# Patient Record
Sex: FEMALE | Employment: STUDENT | URBAN - METROPOLITAN AREA
[De-identification: names, ages, dates, MRNs, and addresses within clinical notes are randomized per-mention and may not be internally consistent; named-entity substitution may affect disease eponyms.]

---

## 2017-04-13 ENCOUNTER — OFFICE VISIT (OUTPATIENT)
Dept: FAMILY MEDICINE CLINIC | Age: 42
End: 2017-04-13

## 2017-04-13 VITALS
SYSTOLIC BLOOD PRESSURE: 122 MMHG | DIASTOLIC BLOOD PRESSURE: 63 MMHG | WEIGHT: 146.2 LBS | HEART RATE: 84 BPM | TEMPERATURE: 98.1 F | OXYGEN SATURATION: 100 % | HEIGHT: 66 IN | BODY MASS INDEX: 23.5 KG/M2 | RESPIRATION RATE: 18 BRPM

## 2017-04-13 DIAGNOSIS — S76.312A LEFT HAMSTRING MUSCLE STRAIN, INITIAL ENCOUNTER: Primary | ICD-10-CM

## 2017-04-13 RX ORDER — ETONOGESTREL AND ETHINYL ESTRADIOL 11.7; 2.7 MG/1; MG/1
INSERT, EXTENDED RELEASE VAGINAL
COMMUNITY

## 2017-04-13 RX ORDER — CETIRIZINE HCL 10 MG
10 TABLET ORAL DAILY
COMMUNITY

## 2017-04-13 RX ORDER — NAPROXEN SODIUM 220 MG
220 TABLET ORAL 2 TIMES DAILY WITH MEALS
COMMUNITY

## 2017-04-13 RX ORDER — BACLOFEN 20 MG/1
20 TABLET ORAL
COMMUNITY

## 2017-04-13 NOTE — PATIENT INSTRUCTIONS
To Do:  · \"use pain as your guide\". .. Meaning, only do pain-free activities. · Consider keeping a detailed activity journal, so you can determine what activities make your discomfort worse, and what you can actually tolerate. · No running until the physical therapists say you can. Hamstring Strain: Care Instructions  Your Care Instructions    A hamstring strain happens when you overstretch, or pull, the muscles that run down the back of your thigh. It can happen when you exercise or lift something or if you're injured in an accident. You may feel pain and tenderness that's worse when you move your injured leg. The back of your thigh may be swollen and bruised. If you have a bad strain, you may not be able to move your leg normally. While a minor strain often heals well with rest and other treatment, a severe strain may require medical treatment. If a severe strain isn't treated, you may have long-lasting problems. Follow-up care is a key part of your treatment and safety. Be sure to make and go to all appointments, and call your doctor if you are having problems. It's also a good idea to know your test results and keep a list of the medicines you take. How can you care for yourself at home? · Rest your injured leg. Don't put weight on it for a day or two. If your doctor advises you to, use crutches to rest the leg. · Put ice or a cold pack on the back of your thigh for 10 to 20 minutes at a time to stop swelling. Put a thin cloth between the ice and your skin. · Wrapping your thigh with an elastic bandage (such as an Ace wrap), will help decrease swelling. Don't wrap it too tightly, since this can cause more swelling below the affected area. · Elevate your thigh on pillows while applying ice and anytime you are sitting or lying down. · Ask your doctor if you can take an over-the-counter pain medicine, such as acetaminophen (Tylenol), ibuprofen (Advil, Motrin), or naproxen (Aleve).  Be safe with medicines. Read and follow all instructions on the label. · Don't do anything that makes the pain worse. Return to your usual level of activity slowly. When should you call for help? Call your doctor now or seek immediate medical care if:  · You have severe or increasing pain. · You have tingling, weakness, or numbness in your injured leg. · You cannot move your injured leg. Watch closely for changes in your health, and be sure to contact your doctor if:  · You do not get better as expected. Where can you learn more? Go to http://adriano-stevan.info/. Enter U475 in the search box to learn more about \"Hamstring Strain: Care Instructions. \"  Current as of: May 23, 2016  Content Version: 11.2  © 5352-6439 [employer]. Care instructions adapted under license by [employer] (which disclaims liability or warranty for this information). If you have questions about a medical condition or this instruction, always ask your healthcare professional. [address] any warranty or liability for your use of this information.

## 2017-04-13 NOTE — PROGRESS NOTES
[facility]  Sports Medicine Consultation Note    Caridad Arce is a 43 y.o. female (: 1975) presenting to obtain consultative services regarding:  Chief Complaint   Patient presents with    Leg Pain     HAMSTRING STRAIN STARTED IN [facility]        PCP: Maryellen Bates NP  Requesting provider: self-referral    Assessment/Plan:   Sherwin Borges was seen today for leg pain. Diagnoses and all orders for this visit:    Left hamstring muscle strain, initial encounter  Initial encounter. Symptomatic x 3mo, and is quite anxious to return to physical activity. Counseled at length re: the difficulty of recovering from hamstring injury without the assistance of formal rehabilitation. Caridad Arce amendable to formal phyiscal therapy, may benefit from dry needling if indicated. Likely would benefit from gait analysi for prevention of future visits. Follow-up in 6-8 weeks to ensure improvement in condition.  -     REFERRAL TO PHYSICAL THERAPY    Future Appointments  Date Time Provider Kavitha Hernandez   2017 10:40 AM Manedep Ochoa MD [facility]       Spent 45min face-to-face, >50% spent on counseling & patient education. Overdue  items:   Ms. Ady Thomas has a reminder for a \"due or due soon\" health maintenance. I have asked that she contact her primary care provider for follow-up on this health maintenance. Management plan & patient instructions discussed with Caridad Arce, who voiced understanding. Thank you for the opportunity to participate in the care of this patient. If any questions or concerns at all, please feel free to contact me. This document may have been created with the aid of dictation software. Text may contain errors, particularly phonetic errors. Leonel Chaney MD  Internal Medicine, Family Medicine & Sports Medicine  2017, 11:30 AM    Patient Instructions (provided in AVS):      To Do:  · \"use pain as your guide\". .. Meaning, only do pain-free activities. · Consider keeping a detailed activity journal, so you can determine what activities make your discomfort worse, and what you can actually tolerate. · No running until the physical therapists say you can. Hamstring Strain: Care Instructions    History:     I was asked to provide consultative services by Alexander Holloway NP for advice/opinion related to evaluating    Chief Complaint   Patient presents with    Leg Pain     HAMSTRING STRAIN STARTED [address]        Is a . Injury to left hamstring 3mo ago, when she switched routine from 2x/wk running on soft sand to 4-5x/wk on soft sand  Went to massage therapy in early Feb, who told her that she has \"Fluid on her knee\". Then went to see her PCP on 2/9/2017, who instructed her to apply ice and stop running. She did stop running as instructed (now 10wks ago), using ice, and alleve ~ 6 tabs daily, which does seem to help. The swelling did improve, but notes continued tingling to the posterior aspect of the left thigh. She is quite frustrated with this injury, and the fact that she hasn't recovered yet. She has been gaining weight, despite trying to stay as active as she can, including spinning. Past Medical History:   Diagnosis Date    ADD (attention deficit disorder)     Bulging of cervical intervertebral disc     Hypercholesterolemia      Past Surgical History:   Procedure Laterality Date    HX TONSILLECTOMY      HX WISDOM TEETH EXTRACTION        reports that she has never smoked. She has never used smokeless tobacco. She reports that she drinks alcohol. She reports that she does not use illicit drugs.   Family History   Problem Relation Age of Onset    Diabetes Mother     Heart Disease Father      Allergies   Allergen Reactions    Codeine Itching    Vicodin [Hydrocodone-Acetaminophen] Hives       Problem List:    There are no active problems to display for this patient. Medications:     No current outpatient prescriptions on file prior to visit. No current facility-administered medications on file prior to visit. Review of Systems:     (positives in bold)   Constitutional: fatigue, weight change, appetite change, fevers, chills   Eyes: itchy eyes, runny eyes, red eyes, eye discharge, vision changes, light sensitivity   Neuro: headaches, vision changes, dizziness, loss of consciousness, burning pain, tingling, numbness   ENT: ear pain, ear pressure, ear popping, ear discharge/drainage, hearing change,nosebleeds, sneezing, runny nose, nasal congestion,  change in sense of smell, sore throat, voice change or hoarse voice,   dry mouth, toothache, jaw popping, difficulty swallowing, painful swallowing,   oral ulcers or canker sores   Cardiovascular: chest pain, palpitations, orthopnea, PND   Respiratory: dyspnea, wheezing, cough, sputum production, hemoptysis   GI: nausea, vomiting, heartburn, abdominal pain, greasy stools,   blood in stool, diarrhea, constipation   : dysuria, hematuria, change in urine, urinary frequency, urinary urgency   MSK: muscle/joint pain, joint swelling   Derm: rashes, skin changes   Allergy/Imm: seasonal allergies, itchy eyes   Endocrine: Polyuria, polydipsia, polyphagia, heat intolerance, cold intolerance   Heme/lymph: easy bleeding/bruising   Psych: Suicidal ideation, homicidal ideation         Physical Assessment:   VS:    Vitals:    04/13/17 1055   BP: 122/63   Pulse: 84   Resp: 18   Temp: 98.1 °F (36.7 °C)   TempSrc: Oral   SpO2: 100%   Weight: 146 lb 3.2 oz (66.3 kg)   Height: 5' 6\" (1.676 m)   PainSc:   0 - No pain       General:     Well-developed, well-nourished female, pleasant, appropriate and conversant. Head:     No facial asymmetry noted. Extremities:     No edema, no TTP bilateral calves. No joint effusions. LEs warm and well-perfused.   Significantly weaker left knee flexion in comparison to right on MMT  TTP left distal 1/3 of hamstring, without palpable defect  nontender bilateral ASIS, greater troch, ITB, ischial tuberosities  5/5 hip abduction/adduction, hip flexion bilaterally without pain  Neuro:     Alert and oriented, CNs II-XII intact, no focal deficits. Skin:      No rashes noted.

## 2017-04-13 NOTE — MR AVS SNAPSHOT
Visit Information Date & Time Provider Department Dept. Phone Encounter #  
 4/13/2017 10:40 AM Paula Garcia MD [address] [place] [address] [phone] [id number] Follow-up Instructions Return in about 2 months (around 6/13/2017) for 30min sports med f/u. Upcoming Health Maintenance Date Due DTaP/Tdap/Td series (1 - Tdap) 1/1/1996 PAP AKA CERVICAL CYTOLOGY 1/1/1996 INFLUENZA AGE 9 TO ADULT 8/1/2016 Allergies as of 4/13/2017  Review Complete On: 4/13/2017 By: Hermilo Rios LPN Severity Noted Reaction Type Reactions Codeine  04/13/2017    Itching Vicodin [Hydrocodone-acetaminophen]  04/13/2017    Hives Current Immunizations  Never Reviewed No immunizations on file. Not reviewed this visit You Were Diagnosed With   
  
 Codes Comments Left hamstring muscle strain, initial encounter    -  Primary ICD-10-CM: C51.233Q ICD-9-CM: 843.8 Vitals BP Pulse Temp Resp Height(growth percentile) Weight(growth percentile) 122/63 (BP 1 Location: Right arm, BP Patient Position: Sitting) 84 98.1 °F (36.7 °C) (Oral) 18 5' 6\" (1.676 m) 146 lb 3.2 oz (66.3 kg) SpO2 BMI OB Status Smoking Status 100% 23.6 kg/m2 Medically Induced Never Smoker Vitals History BMI and BSA Data Body Mass Index Body Surface Area  
 23.6 kg/m 2 1.76 m 2 Preferred Pharmacy Pharmacy Name Phone [facility], [address] [phone] Your Updated Medication List  
  
   
This list is accurate as of: 4/13/17 12:09 PM.  Always use your most recent med list.  
  
  
  
  
 ALEVE 220 mg tablet Generic drug:  naproxen sodium Take 220 mg by mouth two (2) times daily (with meals). baclofen 20 mg tablet Commonly known as:  LIORESAL Take 20 mg by mouth three (3) times daily as needed. cetirizine 10 mg tablet Commonly known as:  ZYRTEC Take 10 mg by mouth daily. ethinyl estradiol-etonogestrel 0.12-0.015 mg/24 hr vaginal ring Commonly known as:  NUVARING  
every twenty-eight (28) days. VYVANSE 60 mg capsule Generic drug:  Lisdexamfetamine Take 60 mg by mouth daily. We Performed the Following REFERRAL TO PHYSICAL THERAPY [BXU19 Custom] Comments:  
 Please eval & treat. Dry needling as indicated. Female therapist requested. Follow-up Instructions Return in about 2 months (around 6/13/2017) for 30min sports med f/u. Referral Information Referral ID Referred By Referred To  
  
 [id number] Kerry Jones 10 [facility], [address] [place] [address] Phone: [phone] Fax: [phone] Visits Status Start Date End Date 1 New Request 4/13/17 4/13/18 If your referral has a status of pending review or denied, additional information will be sent to support the outcome of this decision. Patient Instructions To Do: 
· \"use pain as your guide\". .. Meaning, only do pain-free activities. · Consider keeping a detailed activity journal, so you can determine what activities make your discomfort worse, and what you can actually tolerate. · No running until the physical therapists say you can. Hamstring Strain: Care Instructions Your Care Instructions A hamstring strain happens when you overstretch, or pull, the muscles that run down the back of your thigh. It can happen when you exercise or lift something or if you're injured in an accident. You may feel pain and tenderness that's worse when you move your injured leg. The back of your thigh may be swollen and bruised. If you have a bad strain, you may not be able to move your leg normally.  
While a minor strain often heals well with rest and other treatment, a severe strain may require medical treatment. If a severe strain isn't treated, you may have long-lasting problems. Follow-up care is a key part of your treatment and safety. Be sure to make and go to all appointments, and call your doctor if you are having problems. It's also a good idea to know your test results and keep a list of the medicines you take. How can you care for yourself at home? · Rest your injured leg. Don't put weight on it for a day or two. If your doctor advises you to, use crutches to rest the leg. · Put ice or a cold pack on the back of your thigh for 10 to 20 minutes at a time to stop swelling. Put a thin cloth between the ice and your skin. · Wrapping your thigh with an elastic bandage (such as an Ace wrap), will help decrease swelling. Don't wrap it too tightly, since this can cause more swelling below the affected area. · Elevate your thigh on pillows while applying ice and anytime you are sitting or lying down. · Ask your doctor if you can take an over-the-counter pain medicine, such as acetaminophen (Tylenol), ibuprofen (Advil, Motrin), or naproxen (Aleve). Be safe with medicines. Read and follow all instructions on the label. · Don't do anything that makes the pain worse. Return to your usual level of activity slowly. When should you call for help? Call your doctor now or seek immediate medical care if: 
· You have severe or increasing pain. · You have tingling, weakness, or numbness in your injured leg. · You cannot move your injured leg. Watch closely for changes in your health, and be sure to contact your doctor if: 
· You do not get better as expected. Where can you learn more? Go to http://adriano-stevan.info/. Enter D744 in the search box to learn more about \"Hamstring Strain: Care Instructions. \" Current as of: May 23, 2016 Content Version: 11.2 © 3019-1243 [employer].  Care instructions adapted under license by [address] (which disclaims liability or warranty for this information). If you have questions about a medical condition or this instruction, always ask your healthcare professional. [address] any warranty or liability for your use of this information. Introducing [facility]! [facility] introduces [employer] patient portal. Now you can access parts of your medical record, email your doctor's office, and request medication refills online. 1. In your internet browser, go to [email or web address] [email or web address] 2. Click on the First Time User? Click Here link in the Sign In box. You will see the New Member Sign Up page. 3. Enter your [employer] Access Code exactly as it appears below. You will not need to use this code after youve completed the sign-up process. If you do not sign up before the expiration date, you must request a new code. · [employer] Access Code: [email or web address] Expires: 7/12/2017 12:09 PM 
 
4. Enter the last four digits of your Social Security Number (xxxx) and Date of Birth (mm/dd/yyyy) as indicated and click Submit. You will be taken to the next sign-up page. 5. Create a [employer] ID. This will be your [employer] login ID and cannot be changed, so think of one that is secure and easy to remember. 6. Create a [employer] password. You can change your password at any time. 7. Enter your Password Reset Question and Answer. This can be used at a later time if you forget your password. 8. Enter your e-mail address. You will receive e-mail notification when new information is available in [address] 9. Click Sign Up. You can now view and download portions of your medical record. 10. Click the Download Summary menu link to download a portable copy of your medical information. If you have questions, please visit the Frequently Asked Questions section of the [employer] website.  Remember, [employer] is NOT to be used for urgent needs. For medical emergencies, dial 911. Now available from your iPhone and Android! Please provide this summary of care documentation to your next provider. Your primary care clinician is listed as SILVIA LINARES. If you have any questions after today's visit, please call [phone].

## 2017-06-05 ENCOUNTER — TELEPHONE (OUTPATIENT)
Dept: FAMILY MEDICINE CLINIC | Age: 42
End: 2017-06-05

## 2021-06-15 ENCOUNTER — HOSPITAL ENCOUNTER (OUTPATIENT)
Dept: PHYSICAL THERAPY | Age: 46
Discharge: HOME | End: 2021-06-15
Payer: COMMERCIAL

## 2021-06-15 PROCEDURE — 97535 SELF CARE MNGMENT TRAINING: CPT

## 2021-06-15 PROCEDURE — 97162 PT EVAL MOD COMPLEX 30 MIN: CPT

## 2021-06-15 NOTE — PROGRESS NOTES
[facility] PHYSICAL THERAPY  [address] [place] [address] [address],[place], [address] [facility] - Phone: [phone]  Fax: [phone] / [facility]  Patient Name: Lakisha Saenz : 1975   Medical Diagnosis: Neck pain [M54.2] Treatment Diagnosis: Neck pain [M54.2]   Onset Date: 10 years ago     Referral Source: Neil Gotti MD Start of Care [facility] 6/15/2021   Prior Hospitalization: See medical history Provider #: [id number]   Prior Level of Function: Garden, working out regularly   Comorbidities: Aug 2019 L5-S1 fusion, chronicity of symptoms   Medications: Verified on Patient Summary List   The Plan of Care and following information is based on the information from the initial evaluation.   ===========================================================================================  Assessment / key information:  Patient is 55 y.o. female who presents to [facility], [facility]. with diagnosis of Neck pain [M54.2]. Pt reports insidious onset neck pain approx 10 years ago. Pt has pursued chiropractic and acupuncture services without lasting relief. Objective data detailed below. Patient scored 46 on FOTO indicating decreased function and quality of life. Pt would benefit from skilled PT services to address impairments, work towards goals, and return to OF. Per pt report has had several MRIs and a nerve conduction test, but says their findings are not consistent with her symptoms.     Pain: current 1/10, at worst 8/10, at best 1/10  Aggravating factors: Unable to recall what makes her neck worse, typically hurts more later in the day  Alleviating factors: rest, using a light weight, injections (C1-2, C6-7), TENS unit, massage, stretches, chiropractic (temporary), acupuncture (temporary)  Pain description: tightness, spasms  Pain location: R side of neck, R suboccipitals, bilat rhomboids and traps  Radiation? Numbness/tingling? Previously into thumb through C7 dermatome patterning (managed with Gabapentin)    Cervical AROM: flex WNL, ext WNL, SB R 30 L 30, rot R WNL L WNL  Soft touch: grossly intact  Palpation: TTP and inc tone bilat UT/mid trap, lev scap; R suboccipitals into cervical/thoracic paraspinals    Treatment performed: MH to c-spine and mid/upper back s/p eval in supine H/L x10 mins  Patient response to treatment: Pt reports inc in tenderness as site of palpation, but that [address] helped to decrease it somewhat  ===========================================================================================  Eval Complexity: History MEDIUM  Complexity : 1-2 comorbidities / personal factors will impact the outcome/ POC ;  Examination  MEDIUM Complexity : 3 Standardized tests and measures addressing body structure, function, activity limitation and / or participation in recreation ; Presentation MEDIUM Complexity : Evolving with changing characteristics ; Decision Making MEDIUM Complexity : FOTO score of 26-74; Overall Complexity MEDIUM  Problem List: pain affecting function, decrease ROM, decrease strength, decrease ADL/ functional abilitiies, decrease activity tolerance, decrease flexibility/ joint mobility and decrease transfer abilities   Treatment Plan may include any combination of the following: Therapeutic exercise, Therapeutic activities, Neuromuscular re-education, Physical agent/modality, Manual therapy, Patient education and Functional mobility training  Patient / Family readiness to learn indicated by: asking questions, trying to perform skills and interest  Persons(s) to be included in education: patient (P)  Barriers to Learning/Limitations: None  Measures taken, if barriers to learning:    Patient Goal (s): \"Reduce medication, improve flexibility, reduce spasms\"   Patient self reported health status: fair  Rehabilitation Potential: fair   Short Term Goals:  To be accomplished in 3 weeks:  1) Pt performing HEP as prescribed to facilitate appointment carry over. 2) Patient will report 25% improvement in symptoms during UE functional tasks. 3) Patient to demo cervical SB AROM >=40 in order to have improved functional mobility. 4) Increase FOTO score to 57 indicating improved function and QOL. 5) Pt to report >=+3 on GROC indicating clinically significant subjective functional improvement.  Long Term Goals: To be accomplished in 6 weeks:  1) Patient Independent with progressive HEP to facilitate symptom management and progression upon DC. 2) Increase FOTO score to 63 indicating improved function and QOL. 3) Patient to demo proper DNF activation and control in order to have improved cervical stabilization during functional tasks. 4) Pt to report >=+5 on GROC indicating clinically significant subjective functional improvement. 5) Pt to report subjective improvement in TTP during palpation in order to have improved cervical motility. Frequency / Duration: Patient to be seen  2  times per week for 6  weeks:  Patient / Caregiver education and instruction: other PT diagnosis, prognosis, POC  Therapist Signature: Richar Mathias PT Date: 9/29/2265   Certification Period: na Time: 8:39 AM   ===========================================================================================  I certify that the above Physical Therapy Services are being furnished while the patient is under my care. I agree with the treatment plan and certify that this therapy is necessary. Physician Signature:        Date:       Time:                                        Sp Duncan MD    Please sign and return to [facility], [facility]. or you may fax the signed copy to [phone]. Thank you.

## 2021-06-15 NOTE — PROGRESS NOTES
PHYSICAL THERAPY - DAILY TREATMENT NOTE    Patient Name: Lydia Arreola        Date: 6/15/2021  : 1975   yes Patient  Verified  Visit #:     Insurance: Payor: Belkys Oliva / Plan:  [address] [facility] / Product Type: Commerical /      In time: 845 am Out time: 942 am   Total Treatment Time: 57     Medicare/BCBS Time Tracking (below)   Total Timed Codes (min):  na 1:1 Treatment Time:  na     TREATMENT AREA =  Neck pain [M54.2]    SUBJECTIVE  Pain Level (on 0 to 10 scale):  1 / 10   Medication Changes/New allergies or changes in medical history, any new surgeries or procedures?    no  If yes, update Summary List   Subjective Functional Status/Changes:  []  No changes reported     See POC       OBJECTIVE  Modalities Rationale: decrease pain and increase tissue extensibility to improve patient's ability to perform UE functional tasks and ADLs   min [] Estim, type/location:                                     []  att     []  unatt     []  w/US     []  w/ice    []  w/heat    min []  Mechanical Traction: type/lbs                   []  pro   []  sup   []  int   []  cont    []  before manual    []  after manual    min []  Ultrasound, settings/location:      min []  Iontophoresis w/ dexamethasone, location:                                               []  take home patch       []  in clinic   10 min []  Ice     [x]  Heat    location/position:  To c-spine and mid back s/p eval in supine H/L    min []  Vasopneumatic Device, press/temp:     min []  Other:    [x] Skin assessment post-treatment (if applicable):    [x]  intact    []  redness- no adverse reaction     []redness  adverse reaction:        15 min Self Care: See pt education   Rationale:    Pt education to improve the patient's ability to adhere to PT POC    Billed With/As:  [] TE  [] TA  [] Neuro  [x] Self Care Patient Education: [x] Review HEP    [] Progressed/Changed HEP based on:   [] positioning   [] body mechanics   [] transfers   [] heat/ice application    [x] other: PT diagnosis, prognosis, POC     Other Objective/Functional Measures:    See POC     Post Treatment Pain Level (on 0 to 10) scale:   2  / 10     ASSESSMENT  Assessment/Changes in Function:     Justification for Eval Code Complexity:  Patient History: Gardened, working out regularly  Examination: See exam  Clinical Presentation: evolving  Clinical Decision Making: MEDIUM  FOTO: 46 /100     []  See Progress Note/Recertification   Patient will continue to benefit from skilled PT services to modify and progress therapeutic interventions, address functional mobility deficits, address ROM deficits, address strength deficits, analyze and address soft tissue restrictions, analyze and cue movement patterns, analyze and modify body mechanics/ergonomics and assess and modify postural abnormalities to attain remaining goals. Progress toward goals / Updated goals:    See POC     PLAN  [x]  Upgrade activities as tolerated yes Continue plan of care   []  Discharge due to :    []  Other:      Therapist: Rashawn Andre PT    Date: 6/15/2021 Time: 8:30 AM     No future appointments.

## 2021-07-06 ENCOUNTER — HOSPITAL ENCOUNTER (OUTPATIENT)
Dept: PHYSICAL THERAPY | Age: 46
Discharge: HOME | End: 2021-07-06
Payer: COMMERCIAL

## 2021-07-06 PROCEDURE — 97140 MANUAL THERAPY 1/> REGIONS: CPT

## 2021-07-06 PROCEDURE — 97112 NEUROMUSCULAR REEDUCATION: CPT

## 2021-07-06 PROCEDURE — 97110 THERAPEUTIC EXERCISES: CPT

## 2021-07-06 NOTE — PROGRESS NOTES
PHYSICAL THERAPY - DAILY TREATMENT NOTE    Patient Name: Linnea Collier        Date: 2021  : 1975   yes Patient  Verified  Visit #:      12  Insurance: Payor: Brian Hurley / Plan: [address] / Product Type: Commerical /      In time: 447 am Out time: 835 am   Total Treatment Time: 46     Medicare/BCBS Time Tracking (below)   Total Timed Codes (min):  na 1:1 Treatment Time:  na     TREATMENT AREA =  Neck pain [M54.2]    SUBJECTIVE  Pain Level (on 0 to 10 scale):  0 / 10   Medication Changes/New allergies or changes in medical history, any new surgeries or procedures?    no  If yes, update Summary List   Subjective Functional Status/Changes:  []  No changes reported     \"I had some tightness over the weekend that I was able to make better by taking medicine. \"  Pt also reports she had pain/soreness at site of SOR after her IE for a few days, but it has since resolved. OBJECTIVE  Modalities Rationale: decrease pain and increase tissue extensibility to improve patient's ability to perform UE functional tasks and ADLs   min [] Estim, type/location:                                     []  att     []  unatt     []  w/US     []  w/ice    []  w/heat    min []  Mechanical Traction: type/lbs                   []  pro   []  sup   []  int   []  cont    []  before manual    []  after manual    min []  Ultrasound, settings/location:      min []  Iontophoresis w/ dexamethasone, location:                                               []  take home patch       []  in clinic   10 min []  Ice     [x]  Heat    location/position:  To c-spine pre-manual in supine H/L    min []  Vasopneumatic Device, press/temp: Pre-tx girth:   Post-tx girth:   Measured at:     min []  Other:    [x] Skin assessment post-treatment (if applicable):    [x]  intact    []  redness- no adverse reaction     []redness  adverse reaction:        8 min Therapeutic Exercise:  [x]  See flow sheet   Rationale:      increase ROM and increase strength to improve the patient's ability to perform UE functional tasks and ADLs     10 min Manual Therapy: SOR, STM bilat lev scap/UT/SCM   Rationale:      decrease pain, increase ROM, increase tissue extensibility and decrease trigger points to improve patient's ability to perform UE functional tasks and ADLs  The manual therapy interventions were performed at a separate and distinct time from the therapeutic activities interventions. 8 min Neuromuscular Re-ed: [x]  See flow sheet   Rationale:    increase strength, improve coordination and increase proprioception to improve the patient's ability to perform UE functional tasks and ADLs    Billed With/As:  [] TE  [] TA  [] Neuro  [x] Self Care Patient Education: [x] Review HEP    [] Progressed/Changed HEP based on:   [x] positioning   [x] body mechanics   [] transfers   [] heat/ice application    [] other:     Other Objective/Functional Measures:    Performed exercises per flow sheet     Post Treatment Pain Level (on 0 to 10) scale:   0  / 10     ASSESSMENT  Assessment/Changes in Function:     No adverse effects noted with treatment this date. Discussed self-MFR techniques to assist with SOR and appt carry over/tightness report. Pt verbalized understanding. Dec manual effort during SOR and STM to decrease post-manual intervention soreness. Will continue to monitor. []  See Progress Note/Recertification   Patient will continue to benefit from skilled PT services to modify and progress therapeutic interventions, address functional mobility deficits, address ROM deficits, address strength deficits, analyze and address soft tissue restrictions, analyze and cue movement patterns, analyze and modify body mechanics/ergonomics and assess and modify postural abnormalities to attain remaining goals.    Progress toward goals / Updated goals:    First f/u since POC     PLAN  [x]  Upgrade activities as tolerated yes Continue plan of care   []  Discharge due to :    []  Other:      Therapist: Jamel Polo, PT    Date: 7/6/2021 Time: 7:45 AM     Future Appointments   Date Time Provider Kavitha Hernandez   7/8/2021  8:00 AM Holly Vines, PT [facility]   7/12/2021  7:15 AM Holly Vines, PT [facility]   7/15/2021  7:15 AM Holly Vines, PT [facility]   7/19/2021  7:15 AM Holly Vines, PT [facility]   7/21/2021  7:15 AM Holly Vines, PT [facility]   7/27/2021  7:45 AM Holly Vines, PT [facility]   7/29/2021  7:15 AM Holly Vines, PT [facility]

## 2021-07-08 ENCOUNTER — HOSPITAL ENCOUNTER (OUTPATIENT)
Dept: PHYSICAL THERAPY | Age: 46
Discharge: HOME | End: 2021-07-08
Payer: COMMERCIAL

## 2021-07-08 PROCEDURE — 97140 MANUAL THERAPY 1/> REGIONS: CPT

## 2021-07-08 NOTE — PROGRESS NOTES
PHYSICAL THERAPY - DAILY TREATMENT NOTE    Patient Name: Isidro Bowman        Date: 2021  : 1975   yes Patient  Verified  Visit #:   3   of   12  Insurance: Payor: Jayson Mckinley / Plan:  [address] [facility] / Product Type: Commerical /      In time: 800 am Out time: 838 am   Total Treatment Time: 38     Medicare/BCBS Time Tracking (below)   Total Timed Codes (min):  na 1:1 Treatment Time:  na     TREATMENT AREA =  Neck pain [M54.2]    SUBJECTIVE  Pain Level (on 0 to 10 scale):  2 / 10   Medication Changes/New allergies or changes in medical history, any new surgeries or procedures?    no  If yes, update Summary List   Subjective Functional Status/Changes:  []  No changes reported     Pt reports she woke up with tightness/pain in her R rhomboid/trap. OBJECTIVE  Modalities Rationale: decrease pain and increase tissue extensibility to improve patient's ability to perform UE functional tasks and ADLs   min [] Estim, type/location:                                     []  att     []  unatt     []  w/US     []  w/ice    []  w/heat    min []  Mechanical Traction: type/lbs                   []  pro   []  sup   []  int   []  cont    []  before manual    []  after manual    min []  Ultrasound, settings/location:      min []  Iontophoresis w/ dexamethasone, location:                                               []  take home patch       []  in clinic   10 min []  Ice     [x]  Heat    location/position:  To c-spine pre-manual in supine H/L    min []  Vasopneumatic Device, press/temp: Pre-tx girth:   Post-tx girth:   Measured at:     min []  Other:    [x] Skin assessment post-treatment (if applicable):    [x]  intact    []  redness- no adverse reaction     []redness  adverse reaction:        28 min Manual Therapy: SOR, STM bilat lev scap/UT/SCM   Rationale:      decrease pain, increase ROM, increase tissue extensibility and decrease trigger points to improve patient's ability to perform UE functional tasks and ADLs  The manual therapy interventions were performed at a separate and distinct time from the therapeutic activities interventions. Billed With/As:  [x] MT  [] TA  [] Neuro  [] Self Care Patient Education: [x] Review HEP    [] Progressed/Changed HEP based on:   [x] positioning   [x] body mechanics   [] transfers   [] heat/ice application    [] other:     Other Objective/Functional Measures:    Deferred exercise interventions this date     Post Treatment Pain Level (on 0 to 10) scale:   1  / 10     ASSESSMENT  Assessment/Changes in Function:     Significant MTrP noted in R rhomboid/UT musculature. Applied IASTM techniques to assist with mediation. Inc erythema noted over treated area. Discussed IASTM expectations and HEP to assist with symptom ctl. Pt verbalized understanding. []  See Progress Note/Recertification   Patient will continue to benefit from skilled PT services to modify and progress therapeutic interventions, address functional mobility deficits, address ROM deficits, address strength deficits, analyze and address soft tissue restrictions, analyze and cue movement patterns, analyze and modify body mechanics/ergonomics and assess and modify postural abnormalities to attain remaining goals. Progress toward goals / Updated goals:    · To be accomplished in 3 weeks per POC 6/15/21:  1) Pt performing HEP as prescribed to facilitate appointment carry over. Progressing 7/8/21  2) Patient will report 25% improvement in symptoms during UE functional tasks. 3) Patient to demo cervical SB AROM >=40 in order to have improved functional mobility. 4) Increase FOTO score to 57 indicating improved function and QOL. 5) Pt to report >=+3 on GROC indicating clinically significant subjective functional improvement.        PLAN  [x]  Upgrade activities as tolerated yes Continue plan of care   []  Discharge due to :    []  Other:      Therapist: Mahamed Azul PT    Date: 7/8/2021 Time: 8:00 AM Future Appointments   Date Time Provider Department Center   7/12/2021  7:15 AM Mark Beatty, [facility]   7/15/2021  7:15 AM Mark Beatty, PT [facility]   7/19/2021  7:15 AM Mark Betaty, [facility]   7/21/2021  7:15 AM Mark Beatty, [facility]   7/27/2021  7:45 AM Mark Beatty PT [facility]   7/29/2021  7:15 AM Mark Beatty, [facility]

## 2021-07-12 ENCOUNTER — HOSPITAL ENCOUNTER (OUTPATIENT)
Dept: PHYSICAL THERAPY | Age: 46
Discharge: HOME | End: 2021-07-12
Payer: COMMERCIAL

## 2021-07-12 PROCEDURE — 97140 MANUAL THERAPY 1/> REGIONS: CPT

## 2021-07-12 PROCEDURE — 97110 THERAPEUTIC EXERCISES: CPT

## 2021-07-12 NOTE — PROGRESS NOTES
PHYSICAL THERAPY - DAILY TREATMENT NOTE    Patient Name: Chloe Agarwal        Date: 2021  : 1975   yes Patient  Verified  Visit #:     Insurance: Payor: Jose Man / Plan: [address] [facility] / Product Type: Commerical /      In time: 715 am Out time: 808 am   Total Treatment Time: 53     Medicare/BCBS Time Tracking (below)   Total Timed Codes (min):  na 1:1 Treatment Time:  na     TREATMENT AREA =  Neck pain [M54.2]    SUBJECTIVE  Pain Level (on 0 to 10 scale):  2 / 10   Medication Changes/New allergies or changes in medical history, any new surgeries or procedures?    no  If yes, update Summary List   Subjective Functional Status/Changes:  []  No changes reported     See Progress Note       OBJECTIVE  Modalities Rationale: decrease pain and increase tissue extensibility to improve patient's ability to perform UE functional tasks and ADLs   min [] Estim, type/location:                                     []  att     []  unatt     []  w/US     []  w/ice    []  w/heat    min []  Mechanical Traction: type/lbs                   []  pro   []  sup   []  int   []  cont    []  before manual    []  after manual    min []  Ultrasound, settings/location:      min []  Iontophoresis w/ dexamethasone, location:                                               []  take home patch       []  in clinic   10 min []  Ice     [x]  Heat    location/position:  To c-spine/upper back pre-manual tx    min []  Vasopneumatic Device, press/temp: Pre-tx girth:   Post-tx girth:   Measured at:     min []  Other:    [x] Skin assessment post-treatment (if applicable):    [x]  intact    []  redness- no adverse reaction     []redness  adverse reaction:        8 min Therapeutic Exercise:  [x]  See flow sheet   Rationale:      increase ROM and increase strength to improve the patient's ability to perform UE functional tasks and ADLs     35 min Manual Therapy: STM bilat lev scap/UT/SCM, IASTM to UT/lev scap, posterior cuff   Rationale:      decrease pain, increase ROM, increase tissue extensibility and decrease trigger points to improve patient's ability to perform UE functional tasks and ADLs  The manual therapy interventions were performed at a separate and distinct time from the therapeutic activities interventions. Billed With/As:  [x] TE  [] TA  [] Neuro  [] Self Care Patient Education: [x] Review HEP    [] Progressed/Changed HEP based on:   [] positioning   [] body mechanics   [] transfers   [] heat/ice application    [x] other: PT progress, POC     Other Objective/Functional Measures:    See Progress Note     Post Treatment Pain Level (on 0 to 10) scale:   1  / 10     ASSESSMENT  Assessment/Changes in Function:     See Progress Note     [x]  See Progress Note/Recertification   Patient will continue to benefit from skilled PT services to modify and progress therapeutic interventions, address functional mobility deficits, address ROM deficits, address strength deficits, analyze and address soft tissue restrictions, analyze and cue movement patterns, analyze and modify body mechanics/ergonomics and assess and modify postural abnormalities to attain remaining goals.    Progress toward goals / Updated goals:    See Progress Note     PLAN  [x]  Upgrade activities as tolerated yes Continue plan of care   []  Discharge due to :    []  Other:      Therapist: Melinda Christina PT    Date: 7/12/2021 Time: 7:15 AM     Future Appointments   Date Time Provider Kavitha Hernandez   7/15/2021  7:15 AM Nitza Zhou, PT [facility]   7/19/2021  7:15 AM Nitza Zhou PT [facility]   7/21/2021  7:15 AM Nitza Zhou PT [facility]   7/27/2021  7:45 AM Nitza Zhou PT [facility]   7/29/2021  7:15 AM Nitza Zhou, PT [facility]

## 2021-07-15 ENCOUNTER — HOSPITAL ENCOUNTER (OUTPATIENT)
Dept: PHYSICAL THERAPY | Age: 46
End: 2021-07-15
Payer: COMMERCIAL

## 2021-07-19 ENCOUNTER — HOSPITAL ENCOUNTER (OUTPATIENT)
Dept: PHYSICAL THERAPY | Age: 46
Discharge: HOME | End: 2021-07-19
Payer: COMMERCIAL

## 2021-07-19 PROCEDURE — 97140 MANUAL THERAPY 1/> REGIONS: CPT

## 2021-07-19 NOTE — PROGRESS NOTES
PHYSICAL THERAPY - DAILY TREATMENT NOTE    Patient Name: Vianey Tran        Date: 2021  : 1975   yes Patient  Verified  Visit #:   (5) 1   of   8  Insurance: Payor: Angela Leyva / Plan: [address] / Product Type: Commerical /      In time: 717 am Out time: 807 am   Total Treatment Time: 50     Medicare/BCBS Time Tracking (below)   Total Timed Codes (min):  na 1:1 Treatment Time:  na     TREATMENT AREA =  Neck pain [M54.2]    SUBJECTIVE  Pain Level (on 0 to 10 scale):  2 / 10   Medication Changes/New allergies or changes in medical history, any new surgeries or procedures?    no  If yes, update Summary List   Subjective Functional Status/Changes:  []  No changes reported     Pt reports she woke up at about a 3/10 symptom intensity this morning. She performed her exercises, which brought it down to a 2/10.        OBJECTIVE  Modalities Rationale: decrease pain and increase tissue extensibility to improve patient's ability to perform UE functional tasks and ADLs   min [] Estim, type/location:                                     []  att     []  unatt     []  w/US     []  w/ice    []  w/heat    min []  Mechanical Traction: type/lbs                   []  pro   []  sup   []  int   []  cont    []  before manual    []  after manual    min []  Ultrasound, settings/location:      min []  Iontophoresis w/ dexamethasone, location:                                               []  take home patch       []  in clinic   10 min []  Ice     [x]  Heat    location/position: To upper back and c-spine pre-manual tx in supine H/L    min []  Vasopneumatic Device, press/temp: Pre-tx girth:   Post-tx girth:   Measured at:     min []  Other:    [x] Skin assessment post-treatment (if applicable):    [x]  intact    []  redness- no adverse reaction     []redness  adverse reaction:        40 min Manual Therapy: STM bilat lev scap/UT/SCM, IASTM to UT/lev scap, posterior cuff in prone   Rationale:      decrease pain, increase ROM, increase tissue extensibility and decrease trigger points to improve patient's ability to perform UE functional tasks and ADLs  The manual therapy interventions were performed at a separate and distinct time from the therapeutic activities interventions. Billed With/As:  [x] MT  [] TA  [] Neuro  [] Self Care Patient Education: [x] Review HEP    [] Progressed/Changed HEP based on:   [x] positioning   [x] body mechanics   [] transfers   [] heat/ice application    [] other:     Other Objective/Functional Measures:    Deferred TE this date     Post Treatment Pain Level (on 0 to 10) scale:   2  / 10     ASSESSMENT  Assessment/Changes in Function:     Inc erythema noted over IASTM treatment area. Pt reported inc motility s/p manual interventions. Reviewed HEP. []  See Progress Note/Recertification   Patient will continue to benefit from skilled PT services to modify and progress therapeutic interventions, address functional mobility deficits, address ROM deficits, address strength deficits, analyze and address soft tissue restrictions, analyze and cue movement patterns, analyze and modify body mechanics/ergonomics and assess and modify postural abnormalities to attain remaining goals.    Progress toward goals / Updated goals:    First f/u since PN     PLAN  [x]  Upgrade activities as tolerated yes Continue plan of care   []  Discharge due to :    []  Other:      Therapist: Obdulio Reed PT    Date: 7/19/2021 Time: 7:15 AM     Future Appointments   Date Time Provider Kavitha Hernandez   7/21/2021  7:15 AM Shawanda Mcfarland PT [facility]   7/27/2021  7:45 AM Shawanda Mcfarland PT [facility]   7/29/2021  7:15 AM Shawanda Mcfarland PT [facility]

## 2021-07-21 ENCOUNTER — APPOINTMENT (OUTPATIENT)
Dept: PHYSICAL THERAPY | Age: 46
End: 2021-07-21
Payer: COMMERCIAL

## 2021-07-27 ENCOUNTER — APPOINTMENT (OUTPATIENT)
Dept: PHYSICAL THERAPY | Age: 46
End: 2021-07-27
Payer: COMMERCIAL

## 2021-07-29 ENCOUNTER — APPOINTMENT (OUTPATIENT)
Dept: PHYSICAL THERAPY | Age: 46
End: 2021-07-29
Payer: COMMERCIAL

## 2021-08-17 NOTE — PROGRESS NOTES
[address] [facility] PHYSICAL THERAPY  [address] [facility] [address] [facility] [place] [facility], [address] - Phone: [phone]  Fax: [phone]  DISCHARGE NOTE  Patient Name: Lorraine Kumar : 1975   Treatment/Medical Diagnosis: Neck pain [M54.2]   Referral Source: Quentin Urbina MD     Date of Initial Visit: 6/15/21 Attended Visits: 5 Missed Visits: 1       SUMMARY OF TREATMENT  Pt attended only initial evaluation and  4  follow-ups and then did not return. Pt states she'd like to return to MD before coming back to PT. Pt aware of insurance auth expiration  and that she would need a new order if it surpasses that date. PROGRESS AS OF 21  Goal/Measure of Progress Goal Met? 1. Pt performing HEP as prescribed to facilitate appointment carry over. Status at Last Eval: New goal Current Status: Pt adherent yes   2. Patient will report 25% improvement in symptoms during UE functional tasks. Status at Last Eval: New goal Current Status: 20% improved Progressing   3. Increase FOTO score to 57 indicating improved function and QOL. Status at Last Eval: 52 Current Status: 57 yes   4. Pt to report >=+3 on GROC indicating clinically significant subjective functional improvement. Status at Last Eval: New goal Current Status: +2 Progressing      RECOMMENDATIONS  Discontinue physical therapy per pt request.      If you have any questions/comments please contact us directly at ([phone]. Thank you for allowing us to assist in the care of your patient.     Therapist Signature: Mark Velasco PT Date: 21     Time: 8:16 AM

## 2021-10-30 NOTE — PROGRESS NOTES
[facility] PHYSICAL THERAPY  [address] [place] [address],[facility], [facility] - Phone: [phone]  Fax: [phone]  PROGRESS NOTE  Patient Name: Iman Monge : 1975   Treatment/Medical Diagnosis: Neck pain [M54.2]   Referral Source: Anant Cai MD     Date of Initial Visit: 6/15/21 Attended Visits: 4 Missed Visits: 0     SUMMARY OF TREATMENT  Skilled PT services have consisted of: ther ex, manual therapy, IASTM, neuro re-ed, pt education, HEP  CURRENT STATUS  Patient has made good progress with PT interventions for neck pain. Patient reports 20% overall improvement since beginning PT. Notices most improvement with tightness/pain following PT sessions and has decreased her Gabapentin dose in response. Significant functional improvement denoted with 5 point increase on FOTO and +2 on GROC. Goal/Measure of Progress Goal Met? 1. Pt performing HEP as prescribed to facilitate appointment carry over. Status at Last Eval: New goal Current Status: Pt adherent yes   2. Patient will report 25% improvement in symptoms during UE functional tasks. Status at Last Eval: New goal Current Status: 20% improved Progressing   3. Increase FOTO score to 57 indicating improved function and QOL. Status at Last Eval: 52 Current Status: 57 yes   4. Pt to report >=+3 on GROC indicating clinically significant subjective functional improvement. Status at Last Eval: New goal Current Status: +2 Progressing     New Goals to be achieved in _4_  weeks:  1) Patient Independent with progressive HEP to facilitate symptom management and progression upon DC. 2) Increase FOTO score to 63 indicating improved function and QOL. 3) Patient to demo proper DNF activation and control in order to have improved cervical stabilization during functional tasks. 4) Pt to report >=+5 on GROC indicating clinically significant subjective functional improvement.   5) Pt to report subjective improvement in TTP during palpation in order to have improved cervical motility. 6) Patient to demo cervical SB AROM >=40 in order to have improved functional mobility. RECOMMENDATIONS  Continue seeing pt 1-2x/week x4 more weeks before reassess. If you have any questions/comments please contact us directly at [phone]. Thank you for allowing us to assist in the care of your patient. Therapist Signature: Latesha Juan PT Date: 7/12/2021     Time: 7:15 AM   NOTE TO PHYSICIAN:  PLEASE COMPLETE THE ORDERS BELOW AND FAX TO   [facility] Physical Therapy: [phone]  If you are unable to process this request in 24 hours please contact our office: [phone]    ___ I have read the above report and request that my patient continue as recommended.   ___ I have read the above report and request that my patient continue therapy with the following changes/special instructions:_________________________________________________________   ___ I have read the above report and request that my patient be discharged from therapy. I certify that the above Physical Therapy Services are being furnished while the patient is under my care. I agree with the treatment plan and certify that this therapy is necessary.     Physician Signature:        Date:       Time:                                        Carmen Silva MD 2 seconds or less